# Patient Record
Sex: FEMALE | Race: ASIAN | Employment: UNEMPLOYED | URBAN - METROPOLITAN AREA
[De-identification: names, ages, dates, MRNs, and addresses within clinical notes are randomized per-mention and may not be internally consistent; named-entity substitution may affect disease eponyms.]

---

## 2018-04-13 ENCOUNTER — HOSPITAL ENCOUNTER (OUTPATIENT)
Dept: RADIOLOGY | Facility: HOSPITAL | Age: 33
Discharge: HOME | End: 2018-04-13
Attending: PHYSICIAN ASSISTANT
Payer: COMMERCIAL

## 2018-04-13 ENCOUNTER — OFFICE VISIT (OUTPATIENT)
Dept: SPORTS MEDICINE | Facility: CLINIC | Age: 33
End: 2018-04-13
Payer: COMMERCIAL

## 2018-04-13 VITALS
DIASTOLIC BLOOD PRESSURE: 73 MMHG | SYSTOLIC BLOOD PRESSURE: 109 MMHG | WEIGHT: 150 LBS | HEART RATE: 79 BPM | HEIGHT: 67 IN | BODY MASS INDEX: 23.54 KG/M2

## 2018-04-13 DIAGNOSIS — M25.561 ACUTE PAIN OF RIGHT KNEE: Primary | ICD-10-CM

## 2018-04-13 DIAGNOSIS — M25.561 RIGHT KNEE PAIN, UNSPECIFIED CHRONICITY: ICD-10-CM

## 2018-04-13 PROCEDURE — 73564 X-RAY EXAM KNEE 4 OR MORE: CPT | Mod: TC,50,FY,PO

## 2018-04-13 PROCEDURE — 99999 PR PBB SHADOW E&M-NEW PATIENT-LVL III: CPT | Mod: PBBFAC,,, | Performed by: PHYSICIAN ASSISTANT

## 2018-04-13 PROCEDURE — 73564 X-RAY EXAM KNEE 4 OR MORE: CPT | Mod: 26,50,, | Performed by: RADIOLOGY

## 2018-04-13 PROCEDURE — 99203 OFFICE O/P NEW LOW 30 MIN: CPT | Mod: S$GLB,,, | Performed by: PHYSICIAN ASSISTANT

## 2018-04-16 NOTE — PROGRESS NOTES
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 Pain began 2 months ago after she was forced to work on her feet more at [employer] due to a shortage of employees. She describes her pain as aching and intermittent. Rates her pain at a 4/10 on a pain scale when at its worse.     She reports that the pain is worse with steps.  It also bothers her at night. Hurts when carrying her 22lb kid. Her knee hurts if it is bent to long.    Is affecting ADLs.     No mechanical symptoms, no instability    Review of Systems   Constitution: Negative. Negative for chills, fever and night sweats.   HENT: Negative for congestion and headaches.    Eyes: Negative for blurred vision, left vision loss and right vision loss.   Cardiovascular: Negative for chest pain and syncope.   Respiratory: Negative for cough and shortness of breath.    Endocrine: Negative for polydipsia, polyphagia and polyuria.   Hematologic/Lymphatic: Negative for bleeding problem. Does not bruise/bleed easily.   Skin: Negative for dry skin, itching and rash.   Musculoskeletal: Negative for falls and muscle weakness.   Gastrointestinal: Negative for abdominal pain and bowel incontinence.   Genitourinary: Negative for bladder incontinence and nocturia.   Neurological: Negative for disturbances in coordination, loss of balance and seizures.   Psychiatric/Behavioral: Negative for depression. The patient does not have insomnia.    Allergic/Immunologic: Negative for hives and persistent infections.   All other systems negative      PAST MEDICAL HISTORY: History reviewed. No pertinent past medical history.  PAST SURGICAL HISTORY: History reviewed. No pertinent surgical history.  FAMILY HISTORY: History reviewed. No pertinent family history.  SOCIAL HISTORY:   Social History     Social History    Marital status:      Spouse name: N/A    Number of children: N/A    Years of  "education: N/A     Occupational History    Not on file.     Social History Main Topics    Smoking status: Never Smoker    Smokeless tobacco: Not on file    Alcohol use No    Drug use: No    Sexual activity: Not on file     Other Topics Concern    Not on file     Social History Narrative    No narrative on file       MEDICATIONS:   Current Outpatient Prescriptions:     hydrocortisone-pramoxine (ANALPRAM-HC) 2.5-1 % Crea, Place rectally 3 (three) times daily., Disp: 1 Tube, Rfl: 1    linaclotide (LINZESS) 145 mcg Cap, Take 145 mcg by mouth once daily., Disp: 30 capsule, Rfl: 3  ALLERGIES: Review of patient's allergies indicates:  No Known Allergies    VITAL SIGNS: /73   Pulse 79   Ht 5' 7" (1.702 m)   Wt 68 kg (150 lb)   LMP 04/06/2018 (Exact Date)   BMI 23.49 kg/m²      PHYSICAL EXAMINATION    General:  The patient is alert and oriented x 3.  Mood is pleasant.  Observation of ears, eyes and nose reveal no gross abnormalities.  HEENT: NCAT, sclera nonicteric  Lungs: Respirations are equal and unlabored.    right  KNEE EXAMINATION     OBSERVATION / INSPECTION   Gait:   Nonantalgic   Alignment:  Neutral   Scars:   None   Muscle atrophy: Mild  Effusion:  None   Warmth:  None   Discoloration:   none     TENDERNESS / CREPITUS (T / C):          T / C      T / C   Patella   - / -   Lateral joint line   - / -      Peripatellar medial  +  Medial joint line    - / -   Peripatellar lateral +  Medial plica   - / -   Patellar tendon -   Popliteal fossa  - / -   Quad tendon   -   Gastrocnemius   -   Prepatellar Bursa - / -   Quadricep   -   Tibial tubercle  -  Thigh/hamstring  -   Pes anserine/HS -  Fibula    -   ITB   - / -  Tibia     -   Tib/fib joint  - / -  LCL    -     MFC   - / -   MCL: Proximal  -    LFC   - / -    Distal   -          ROM: (* = pain)  PASSIVE   ACTIVE    Left :   5 / 0 / 145   5 / 0 / 145     Right :    5 / 0 / 145   5 / 0 / 145    Patellofemoral examination:  See above noted areas of " tenderness.   Patella position    Subluxation / dislocation: Centered           Sup. / Inf;   Normal   Crepitus (PF):    Absent   Patellar Mobility:       Medial-lateral:   Normal    Superior-inferior:  Normal    Inferior tilt   Normal    Patellar tendon:  Normal   Lateral tilt:    Normal   J-sign:     None   Patellofemoral grind:   neg      MENISCAL SIGNS:     Pain on terminal extension:  -  Pain on terminal flexion:  -  Pricillas maneuver:  -  Squat     + anterior    LIGAMENT EXAMINATION:  ACL / Lachman:  normal (-1 to 2mm)    PCL-Post.  drawer: normal 0 to 2mm  MCL- Valgus:  normal 0 to 2mm  LCL- Varus:  normal 0 to 2mm  Pivot shift: normal (Equal)   Dial Test: difference c/w other side   At 30° flexion: normal (< 5°)    At 90° flexion: normal (< 5°)   Reverse Pivot Shift:   normal (Equal)     STRENGTH: (* = with pain) PAINFUL SIDE   Quadricep   4+/5   Hamstrin/5    EXTREMITY NEURO-VASCULAR EXAMINATION:   Sensation:  Grossly intact to light touch all dermatomal regions.   Motor Function:  Fully intact motor function at hip, knee, foot and ankle    DTRs;  quadriceps and  achilles 2+.  No clonus and downgoing Babinski.    Vascular status:  DP and PT pulses 2+, brisk capillary refill, symmetric.     Other Findings:  + step down bilat  + bridge test     Xrays:  Xrays of the bilateral knees with flexion were ordered and reviewed by me today. No fracture, subluxation, or other significant bony or joint abnormality is identified. Increased lateral tilt of the right patella compared to left noted.      ASSESSMENT:    1. Right Knee pain, acute  Bilateral hip abd/core weakness    PLAN:    1. PT for right knee pranav-patellar pain likely due to medial knee weakness, hip, pelvis and core weakness. PT for bilateral hip and core strengthening with focused work on right medial knee muscles and quad on right side. With HEP.  2. Ice compresses prn pain  3. NSAIDs prn  4. Recommended knee sleeve but patient would prefer to  hold off at this time  5. RTC in 2-3 for recheck    I have made the decision to order and/or review imaging (such as Xray, MRI, or CT) today. I have independently reviewed and interpreted the imaging studies documented above.      All questions were answered, pt will contact us for questions or concerns in the interim.

## 2018-04-18 ENCOUNTER — CLINICAL SUPPORT (OUTPATIENT)
Dept: REHABILITATION | Facility: HOSPITAL | Age: 33
End: 2018-04-18
Payer: COMMERCIAL

## 2018-04-18 DIAGNOSIS — R26.89 IMPAIRED GAIT AND MOBILITY: ICD-10-CM

## 2018-04-18 DIAGNOSIS — M25.561 CHRONIC PAIN OF RIGHT KNEE: Primary | ICD-10-CM

## 2018-04-18 DIAGNOSIS — R29.3 POSTURE ABNORMALITY: ICD-10-CM

## 2018-04-18 DIAGNOSIS — G89.29 CHRONIC PAIN OF RIGHT KNEE: Primary | ICD-10-CM

## 2018-04-18 DIAGNOSIS — R53.1 DECREASED STRENGTH: ICD-10-CM

## 2018-04-18 PROCEDURE — 97161 PT EVAL LOW COMPLEX 20 MIN: CPT | Mod: PN

## 2018-04-18 PROCEDURE — 97140 MANUAL THERAPY 1/> REGIONS: CPT | Mod: PN

## 2018-04-18 PROCEDURE — 97110 THERAPEUTIC EXERCISES: CPT | Mod: PN

## 2018-04-18 NOTE — PLAN OF CARE
TIME RECORD    Date: 4/18/2018    Start Time:  1520  Stop Time:  1610    PROCEDURES:    TIMED  Procedure Min.   TE 8   MT 12                 UNTIMED  Procedure Min.   IE 30         Total Timed Minutes:  20  Total Timed Units:  2  Total Untimed Units:  1  Charges Billed/# of units:  MT-1, TE-1, LCE-1    [facility]    PHYSICAL THERAPY EVALUATION  Onset Date: ~3 months ago  Primary Diagnosis:   1. Chronic pain of right knee     2. Posture abnormality     3. Decreased strength     4. Impaired gait and mobility       Treatment Diagnosis: R knee pain, impaired pelvic alignment, poor posture, decreased LE strength and flexibility, impaired mobility and gait  No past medical history on file.  Precautions: Standard  Prior Therapy: Never  Medications: Gertrude Callaway has a current medication list which includes the following prescription(s): hydrocortisone-pramoxine and linaclotide.  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 Pain has worsened since and is located primarily to the anterior aspect of the knee, alternating between medial and lateral sides. Stair ascent causes posterior knee pain. Pt had X-rays performed and reports tilting of R knee cap.    Pain:  Location: Anterior aspect of R knee.  Description: Intermittent, pinching, ache  Activities Which Increase Pain: Standing > 30 minutes, stair ascent, knee extension, crossing legs (crossing R leg over L increases  R lateral knee pain, opposite increases R medial pain); driving >15 minutes while putting pressure into pedals.  Activities Which Decrease Pain: Knee flexion  Pain Scale: 0/10 at best 5/10 now  6/10 at worst    Objective     Posture: Standing: B knee valgus, rearfoot valgus B L>R, R PSIS elevated compared to L  Palpation: Normal patellar mobility, discomfort in all planes on R  TTP to R piriformis  R iliac crest, AIIS, and medial malleolus lower compared to R in supine  Range of Motion/Strength:      LE MMTs  Hip flexion: 4/5 B  Hip extension: 4-5 B  Hip abudction: 4-5 R, 4/5 L  Hip adduction: 4-5 R, 4/5 L  Knee flexion: 5/5 B  Knee extension: 4/5 R, 4+/5 L  Ankle DF: 4/5 B    Flexibility: Darren test: (+) for impaired ITB flexibility B with reports of discomfort to lateral and center aspect of R patella   Prone knee bend (-) B   9090 HS length: -25 deg knee ext B  Gait: Without AD  Analysis: B trendelenburg gait; B knee valgus and toe out throughout  Bed Mobility:Independent  Transfers: Independent  Special Tests: Hamilton's compression: (+) on R for increased pain to lateral aspect of R knee towards full knee extension  Gabriel's sign (+) on R for minimal pain to lateral aspect of R2 knee with application of pressure during quad set  McMurrary's test (-) on R for pain with medial and lateral meniscus tests, NT L  Valgus test  (-) on R for pain at full knee extension and 30 deg knee flexion, NT L  Varus test  (-) on R for pain at full knee extension and 30 deg knee flexion, NT L  Long sitting test: (+) for R anterior rotated innominate     Functional Limitation Reports: G codes  Tool: FOTO Knee SURVEY  Category: Mobility ()  Limitation: 36%  Current: CJ = at least 20% but < 40% impaired, limited or restricted  Goal: CJ = at least 20% but < 40% impaired, limited or restricted    TREATMENT     Time In: 1520  Time Out: 1610    PT Evaluation Completed? Yes  Discussed Plan of Care with patient: Yes    Gertrude received 8  "minutes of therapeutic exercise & instruction including: mechanisms underlying current R lateral knee pain and exercises per log below    Date 4/18/18   Visit  Exp 12/31/18 1/50   POC 6/18/18     FOTO 1/5         Bike           MHP           MT 12'         Stretches     Supine HS     Supine ITB x30" R   Piriformis           Supine:     TAs    Dead Bug     Bridge     Self met for anterior rotated innominate correction HEP   Hip add/abd self met for pelvic alignment HEP   SLR w/ hip ER    SL hip abd     SL hip add    SL clams           Prone:     Alt UE/LE           Seated:     TAs     March     LAQs           Standing:     TAs     Lats     Rows     Steamboats    Leg Press             Initials CC     Gertrude received 12 minutes of manual therapy including: MET to correct and stabilize R anterior rotated innominate. Alleviation of R knee pain following with reports of R hip soreness.    Written Home Exercises Provided: See above  Gertrude demo good understanding of the education provided. Patient demo fair return demo of skill of exercises.    Assessment     Initial Assessment (Pertinent finding, problem list and factors affecting outcome): Pt is a 31 yo female presenting to PT with R knee pain possibly related to impaired pelvic alignment and possible ITB dysfunction. Impairments include pain greatest laterally during objective, (+) special tests, R anterior rotated innominate, decreased LE strength, impaired LE flexibility, poor posture, impaired gait, and functional deficits with standing and stair ascent. Pt would benefit from skilled PT to address limitations and increase functional mobility.    History  Co-morbidities and personal factors that may impact the plan of care Examination  Body Structures and Functions, activity limitations and participation restrictions that may impact the plan of care    Clinical Presentation   Co-morbidities:   HA        Personal Factors:   no deficits Body Regions:   back  lower " "extremities    Body Systems:    gross symmetry  ROM  strength  gross coordinated movement  balance  gait  transfers  motor control            Participation Restrictions:   None      Activity limitations:   Learning and applying knowledge  no deficits    General Tasks and Commands  no deficits    Communication  no deficits    Mobility  Standing, walking, stair ascent, knee extension    Self care  no deficits    Domestic Life  no deficits    Interactions/Relationships  no deficits    Life Areas  no deficits    Community and Social Life  community life  recreation and leisure         evolving clinical presentation with changing clinical characteristics                      moderate   low  high Decision Making/ Complexity Score:  moderate     Rehab Potiential: good  Spiritual/Cultural Needs: none reported  Barriers to Rehab: none  Short Term Goals (4 Weeks): 5/18/18  1. Pt will be compliant with HEP to supplement PT in decreasing pain with functional mobility.  2. Pt will perform and hold TA contraction for 10x10" in dynamic sitting activities to demonstrate improved core strength  3. Pt will improve impaired LE MMTs to >/=4/5 to improve strength for functional tasks.  4. Pt will improve B 9090 HS length >/=15 deg B to improve flexibility for normal movement patterns.   Long Term Goals (8 Weeks): 6/18/18  1. Pt will improve FOTO score to </= 25% limited to decrease perceived limitation with maintaining/changing body position  2. Pt will improve B 9090 HS length to full extension B to improve flexibility for normal movement patterns.   3. Pt will perform and hold TA contraction for 10x10" in dynamic standing activities to demonstrate improved core strength   4. Pt will improve impaired LE MMTs to >/=4+/5 to improve strength for functional tasks.  5. Pt will report no pain with standing > 1 hr to promote functional QOL.  6. Pt will report no pain with stair ascent promote functional mobility.  7. Pt will demonstrate " proper pelvic alignment for >/= 1 week to decrease stresses placed on surrounding structures during functional movement.    Plan     Certification Period: 4/18/18 to 6/18/18  Recommended Treatment Plan: 2 times per week for 8 weeks: Gait Training, Manual Therapy, Moist Heat/ Ice, Neuromuscular Re-ed, Patient Education, Therapeutic Activites and Therapeutic Exercise  Other Recommendations: modalities prn, ASTYM prn, kinesiotape prn, Functional Dry Needling prn       Sandra Mcduffie, PT  4/18/2018      I CERTIFY THE NEED FOR THESE SERVICES FURNISHED UNDER THIS PLAN OF TREATMENT AND WHILE UNDER MY CARE    Physician's comments: ________________________________________________________________________________________________________________________________________________      Physician's Name: ___________________________________

## 2018-04-20 ENCOUNTER — CLINICAL SUPPORT (OUTPATIENT)
Dept: REHABILITATION | Facility: HOSPITAL | Age: 33
End: 2018-04-20
Payer: COMMERCIAL

## 2018-04-20 DIAGNOSIS — R53.1 DECREASED STRENGTH: ICD-10-CM

## 2018-04-20 DIAGNOSIS — R26.89 IMPAIRED GAIT AND MOBILITY: ICD-10-CM

## 2018-04-20 DIAGNOSIS — R29.3 POSTURE ABNORMALITY: ICD-10-CM

## 2018-04-20 DIAGNOSIS — G89.29 CHRONIC PAIN OF RIGHT KNEE: ICD-10-CM

## 2018-04-20 DIAGNOSIS — M25.561 CHRONIC PAIN OF RIGHT KNEE: ICD-10-CM

## 2018-04-20 PROCEDURE — 97110 THERAPEUTIC EXERCISES: CPT | Mod: PN

## 2018-04-20 PROCEDURE — 97140 MANUAL THERAPY 1/> REGIONS: CPT | Mod: PN

## 2018-04-20 NOTE — PROGRESS NOTES
"DAILY TREATMENT NOTE    DATE: 4/20/2018    Start Time:  2:05  Stop Time:  3:00      PROCEDURES:    TIMED  Procedure Min.   TE 25   MT 10                 UNTIMED  Procedure Min.   Supervised/bike 20         Total Timed Minutes:  55  Total Timed Units:  4  Total Untimed Units:  0  Charges Billed/# of units: 2 TE + 1 MT      Progress/Current Status    Subjective:     Patient ID: Gertrude Callaway is a 32 y.o. female.  Diagnosis:   1. Chronic pain of right knee     2. Posture abnormality     3. Decreased strength     4. Impaired gait and mobility       Pain: 4 /10  R knee pain that is mainly on the medial and lateral sides    Objective:     Pt performed supervised TE x 20 mins per chart below. Pt received MT x 10 mins of hip/leg assessment and MET. Leg length assessed for discrepancy demo R>L leg length with level ASIS and no change with long sitting. PT performed MET with shotgun x 2 with capitation heard with shotgun. Improved leg length B and no change in ASIS level, cont to assess and perform as needed. Pt then performed TE x 25 mins per chart below with mod cueing for form/technique.     Date 4/20/18 4/18/18   Visit  Exp 12/31/18 2/50 1/50   POC 6/18/18      FOTO 2/5 1/5          Bike Next             MHP             MT 10' 12'          Stretches      Supine HS 3x30'' B     Supine ITB 3x30'' R x30" R   Piriformis 3x30'' R            Supine:      TAs Next ?     Dead Bug Next ?     Bridge 2x10 w/ hip add     Self met for anterior rotated innominate correction -- HEP   Hip add/abd self met for pelvic alignment -- HEP   SLR w/ hip ER 2x10     SL hip abd Next      SL hip add W/ bridge     SL clams 2x10 5'' holds  RTB            Prone:      Alt UE/LE Next?     Hip ext Next?     Hydrants     Seated:      TAs March      LAQs             Standing:      TAs      Lats 2x10 GTC     Rows      Steamboats      Leg Press 2x10 DL  5 plates, bench = 5              Initials DILIP CC       Assessment:     Pt performed well today and " "required mod cueing at times for speed and orientation of body during TE mainly SLR and leg press. Pt reported muscle fatigue and muscle burning during and after session, but minimal to no aching R hip and knee pain.     Patient Education/Response:     Cont HEP    Plans and Goals:     Cont per POC, progress per pt tolerance.    Short Term Goals (4 Weeks): 5/18/18  1. Pt will be compliant with HEP to supplement PT in decreasing pain with functional mobility.  2. Pt will perform and hold TA contraction for 10x10" in dynamic sitting activities to demonstrate improved core strength  3. Pt will improve impaired LE MMTs to >/=4/5 to improve strength for functional tasks.  4. Pt will improve B 9090 HS length >/=15 deg B to improve flexibility for normal movement patterns.   Long Term Goals (8 Weeks): 6/18/18  1. Pt will improve FOTO score to </= 25% limited to decrease perceived limitation with maintaining/changing body position  2. Pt will improve B 9090 HS length to full extension B to improve flexibility for normal movement patterns.   3. Pt will perform and hold TA contraction for 10x10" in dynamic standing activities to demonstrate improved core strength   4. Pt will improve impaired LE MMTs to >/=4+/5 to improve strength for functional tasks.  5. Pt will report no pain with standing > 1 hr to promote functional QOL.  6. Pt will report no pain with stair ascent promote functional mobility.  7. Pt will demonstrate proper pelvic alignment for >/= 1 week to decrease stresses placed on surrounding structures during functional movement.     "

## 2018-04-23 ENCOUNTER — CLINICAL SUPPORT (OUTPATIENT)
Dept: REHABILITATION | Facility: HOSPITAL | Age: 33
End: 2018-04-23
Payer: COMMERCIAL

## 2018-04-23 DIAGNOSIS — G89.29 CHRONIC PAIN OF RIGHT KNEE: ICD-10-CM

## 2018-04-23 DIAGNOSIS — M25.561 CHRONIC PAIN OF RIGHT KNEE: ICD-10-CM

## 2018-04-23 DIAGNOSIS — R26.89 IMPAIRED GAIT AND MOBILITY: ICD-10-CM

## 2018-04-23 DIAGNOSIS — R29.3 POSTURE ABNORMALITY: ICD-10-CM

## 2018-04-23 DIAGNOSIS — R53.1 DECREASED STRENGTH: ICD-10-CM

## 2018-04-23 PROCEDURE — 97110 THERAPEUTIC EXERCISES: CPT | Mod: PN

## 2018-04-23 PROCEDURE — 97140 MANUAL THERAPY 1/> REGIONS: CPT | Mod: PN

## 2018-04-23 NOTE — PROGRESS NOTES
"DAILY TREATMENT NOTE    DATE: 4/23/2018    Start Time: 1506  Stop Time: 1608      PROCEDURES:    TIMED  Procedure Min.   TE 50   MT 12                 UNTIMED  Procedure Min.   Supervised/bike 20         Total Timed Minutes: 62  Total Timed Units:  4  Total Untimed Units:  0  Charges Billed/# of units: TE-3, MT-1      Progress/Current Status    Subjective:     Patient ID: Gertrude Callaway is a 32 y.o. female.  Diagnosis:   1. Chronic pain of right knee     2. Posture abnormality     3. Decreased strength     4. Impaired gait and mobility       Pain: 0/10    Pt reports improvement in knee pain since eval, increased soreness in hips after session on Friday. Pt reports she has not been performing self MET everyday.    Objective:     MT x 12' including assessment of pelvic alignment and rotation MET and hip add/abd MET 2x to correct R anterior rotated innominate, no change on first attempt and min-mod improvement on second attempt. TE x 50' per log.     Date 4/23/18 4/20/18 4/18/18   Visit  Exp 12/31/18 3/50 2/50 1/50   POC 6/18/18       FOTO 3/5 2/5 1/5           Bike OOT Next              MHP               MT 12' 10' 12'           Stretches       Supine HS 3x30" L 3x30'' B     Supine ITB 3x30'' R 3x30'' R x30" R   Piriformis 3x30'' R 3x30'' R     Darren  3x30" R             Supine:       TAs 5"x15 Next ?     Abdominal isometric  5"x10 fwd/side     Dead Bug 2x5 B, knee extension/hip neutral over mat Next ?     Bridge 2x15 DL w/ hip add 2x10 w/ hip add     Self met for anterior rotated innominate correction  -- HEP   Hip add/abd self met for pelvic alignment  -- HEP   SLR w/ hip ER 2x10 B 2x10     SL hip abd 2x10 B Next      SL hip add x10 B W/ bridge     SL clams 2x10 3'' holds  RTB 2x10 5'' holds  RTB             Prone:       Alt UE/LE Next Next?     Hip ext Next Next?     Hydrants            Seated:       TAs March       LAQs               Standing:       TAs       Lats OOT 2x10 GTC     Rows       Steamboats     " "  Leg Press OOT 2x10 DL  5 plates, bench = 5               Initials CC DILIP CC     Assessment:     Mod cues for increasing speed of exercise performance, session limited 2/2 time taken to perform exercises. Pt appeared challenged performing SL hip exercises as well as dead bug. Pt reported muscle fatigue and muscle burning during and after session, but no increase in pain.    Patient Education/Response:     Cont HEP, perform MET 1-2x/day. PT verbalized understanding.     Plans and Goals:     Cont per POC, progress per pt tolerance.    Short Term Goals (4 Weeks): 5/18/18  1. Pt will be compliant with HEP to supplement PT in decreasing pain with functional mobility.  2. Pt will perform and hold TA contraction for 10x10" in dynamic sitting activities to demonstrate improved core strength  3. Pt will improve impaired LE MMTs to >/=4/5 to improve strength for functional tasks.  4. Pt will improve B 9090 HS length >/=15 deg B to improve flexibility for normal movement patterns.   Long Term Goals (8 Weeks): 6/18/18  1. Pt will improve FOTO score to </= 25% limited to decrease perceived limitation with maintaining/changing body position  2. Pt will improve B 9090 HS length to full extension B to improve flexibility for normal movement patterns.   3. Pt will perform and hold TA contraction for 10x10" in dynamic standing activities to demonstrate improved core strength   4. Pt will improve impaired LE MMTs to >/=4+/5 to improve strength for functional tasks.  5. Pt will report no pain with standing > 1 hr to promote functional QOL.  6. Pt will report no pain with stair ascent promote functional mobility.  7. Pt will demonstrate proper pelvic alignment for >/= 1 week to decrease stresses placed on surrounding structures during functional movement.     "

## 2018-04-26 ENCOUNTER — CLINICAL SUPPORT (OUTPATIENT)
Dept: REHABILITATION | Facility: HOSPITAL | Age: 33
End: 2018-04-26
Payer: COMMERCIAL

## 2018-04-26 DIAGNOSIS — G89.29 CHRONIC PAIN OF RIGHT KNEE: ICD-10-CM

## 2018-04-26 DIAGNOSIS — R26.89 IMPAIRED GAIT AND MOBILITY: ICD-10-CM

## 2018-04-26 DIAGNOSIS — M25.561 CHRONIC PAIN OF RIGHT KNEE: ICD-10-CM

## 2018-04-26 DIAGNOSIS — R53.1 DECREASED STRENGTH: ICD-10-CM

## 2018-04-26 DIAGNOSIS — R29.3 POSTURE ABNORMALITY: ICD-10-CM

## 2018-04-26 PROCEDURE — 97110 THERAPEUTIC EXERCISES: CPT | Mod: PN

## 2018-04-26 NOTE — PROGRESS NOTES
"TIME RECORD    Date:  04/26/2018    Start Time:  08:00 am   Stop Time:  09:00 am     PROCEDURES:    TIMED  Procedure Time Min.   TE Start:08:00 am   Stop:08;40 am  40   TE (supervised  Start:08:40 am   Stop:09:00 am  20 (no charge)      Total Timed Minutes:  40  Total Timed Units:  3  Total Untimed Units:  0  Charges Billed/# of units:  3 TE    Progress/Current Status    Subjective:     Patient ID: Gertrude Callaway is a 32 y.o. female.  Diagnosis:   1. Chronic pain of right knee     2. Posture abnormality     3. Decreased strength     4. Impaired gait and mobility       Pain: 1 /10  Patient reports I can't say that I don't have no pain but it is probably down to about a 1/10. 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     Objective:     Patient completed therex per log as below 1:1 with PT x 40 minutes and an additional 20 minutes with PT tech under direct PT supervision including patient on stationary bike for trial without any complaints of increase in pain. PT also placed trial kinesiotape at right patellar region with inhibition for patellar tendon with patient educated to remove if patient experiencing any increase in pain, redness or soreness with patient demonstrating understanding. No significant tenderness to palpation throughout right patellar region.      Date 4/26/18 4/23/18 4/20/18 4/18/18   Visit  Exp 12/31/18 4/50 3/50 2/50 1/50   POC 6/18/18        FOTO 4/5 3/5 2/5 1/5            Bike 5' OOT Next               MHP --                MT -- 12' 10' 12'            Stretches        Supine HS 2x30" B 3x30" L 3x30'' B     Supine ITB 2x30" B 3x30'' R 3x30'' R x30" R   Piriformis 2x30" B 3x30'' R 3x30'' R     Darren  2x30" R 3x30" R     Gastroc Stretch - Fitter 2x30 B       Supine:        TAs 5"x15 5"x15 Next ?     Abdominal isometric  -- 5"x10 fwd/side     Dead " "Bug -- 2x5 B, knee extension/hip neutral over mat Next ?     Bridge 2x15 DL   w/hip add 2x15 DL w/ hip add 2x10 w/ hip add     Self met for anterior rotated innominate correction --  -- HEP   Hip add/abd self met for pelvic alignment --  -- HEP   SLR w/ hip ER 2x10 B 2x10 B 2x10     SL hip abd 2x10 B 2x10 B Next      SL hip add 2x10 B x10 B W/ bridge     SL clams -- 2x10 3'' holds  RTB 2x10 5'' holds  RTB              Prone:        Alt UE/LE -- Next Next?     Hip ext 2x10 B *note Next Next?     Hydrants              Seated:        TAs        March        LAQs                 Standing:        TAs --       Lats -- OOT 2x10 GTC     Rows --       Steamboats --       Cybex knee ext 10#  2x10       Leg Press S=5, 5.0  2x10 DL OOT 2x10 DL  5 plates, bench = 5                Initials MNB CC DILIP CC       Assessment:     Patient with increased reports of fatigue through right LE throughout session including at right quad and hip musculature however no significant complaints of increase in pain. Patient with trial kinesiotape to unload right patellar tendon region and no significant tenderness to palpation throughout right patellar region.     Patient Education/Response:     Patient educated to continue to complete HEP on days not attending therapy with patient demonstrating understanding. Patient educated on kinesiotape     Plans and Goals:     Continue as paul, progress with right LE stretching and strengthening. Assess aligment of pelvis as able and adjust as needed.     Short Term Goals (4 Weeks): 5/18/18  1. Pt will be compliant with HEP to supplement PT in decreasing pain with functional mobility.  2. Pt will perform and hold TA contraction for 10x10" in dynamic sitting activities to demonstrate improved core strength  3. Pt will improve impaired LE MMTs to >/=4/5 to improve strength for functional tasks.  4. Pt will improve B 9090 HS length >/=15 deg B to improve flexibility for normal movement patterns.   Long Term " "Goals (8 Weeks): 6/18/18  1. Pt will improve FOTO score to </= 25% limited to decrease perceived limitation with maintaining/changing body position  2. Pt will improve B 9090 HS length to full extension B to improve flexibility for normal movement patterns.   3. Pt will perform and hold TA contraction for 10x10" in dynamic standing activities to demonstrate improved core strength   4. Pt will improve impaired LE MMTs to >/=4+/5 to improve strength for functional tasks.  5. Pt will report no pain with standing > 1 hr to promote functional QOL.  6. Pt will report no pain with stair ascent promote functional mobility.  7. Pt will demonstrate proper pelvic alignment for >/= 1 week to decrease stresses placed on surrounding structures during functional movement.         "

## 2018-05-02 ENCOUNTER — CLINICAL SUPPORT (OUTPATIENT)
Dept: REHABILITATION | Facility: HOSPITAL | Age: 33
End: 2018-05-02
Payer: COMMERCIAL

## 2018-05-02 DIAGNOSIS — G89.29 CHRONIC PAIN OF RIGHT KNEE: ICD-10-CM

## 2018-05-02 DIAGNOSIS — R53.1 DECREASED STRENGTH: ICD-10-CM

## 2018-05-02 DIAGNOSIS — R29.3 POSTURE ABNORMALITY: ICD-10-CM

## 2018-05-02 DIAGNOSIS — R26.89 IMPAIRED GAIT AND MOBILITY: ICD-10-CM

## 2018-05-02 DIAGNOSIS — M25.561 CHRONIC PAIN OF RIGHT KNEE: ICD-10-CM

## 2018-05-02 PROCEDURE — 97110 THERAPEUTIC EXERCISES: CPT | Mod: PN

## 2018-05-04 ENCOUNTER — TELEPHONE (OUTPATIENT)
Dept: REHABILITATION | Facility: HOSPITAL | Age: 33
End: 2018-05-04

## 2018-05-04 ENCOUNTER — CLINICAL SUPPORT (OUTPATIENT)
Dept: REHABILITATION | Facility: HOSPITAL | Age: 33
End: 2018-05-04
Payer: COMMERCIAL

## 2018-05-04 DIAGNOSIS — R29.3 POSTURE ABNORMALITY: ICD-10-CM

## 2018-05-04 DIAGNOSIS — G89.29 CHRONIC PAIN OF RIGHT KNEE: ICD-10-CM

## 2018-05-04 DIAGNOSIS — R26.89 IMPAIRED GAIT AND MOBILITY: ICD-10-CM

## 2018-05-04 DIAGNOSIS — R53.1 DECREASED STRENGTH: ICD-10-CM

## 2018-05-04 DIAGNOSIS — M25.561 CHRONIC PAIN OF RIGHT KNEE: ICD-10-CM

## 2018-05-04 PROCEDURE — 97110 THERAPEUTIC EXERCISES: CPT | Mod: PN

## 2018-05-04 NOTE — PROGRESS NOTES
"DAILY TREATMENT NOTE    DATE: 5/4/2018    Start Time:  415  Stop Time:  515    PROCEDURES:    TIMED  Procedure Min.   Therex supervised 25   Therex 25         Total Timed Minutes:  25  Total Timed Units:  2  Total Untimed Units:  0  Charges Billed/# of units:  2 TE      Progress/Current Status    Subjective:     Patient ID: Gertrude Callaway is a 32 y.o. female.  Diagnosis:   1. Chronic pain of right knee     2. Posture abnormality     3. Decreased strength     4. Impaired gait and mobility       Patient reports not having any knee pain today despite being on her feet all day at work.    Objective:     Pt performed supervised TE x 25 mins per log below followed by TE 1:1 with PTA x 25 mins. Required several short rest breaks, trial L neural glides 2x10 to resolve reports of "numbness tickling" after stretching.    Date 5/4/18 5/2/18 4/26/18 4/23/18 4/20/18 4/18/18   Visit  Exp 12/31/18 6/50 5/50 4/50 3/50 2/50 1/50   POC 6/18/18          FOTO 6/10  DONE 5/5  DONE 4/5 3/5 2/5 1/5      NT        Bike 5  5' OOT Next                 MHP  -- --                  MT  -- -- 12' 10' 12'              Stretches          Supine HS 2x30'' B 2x30'' B 2x30" B 3x30" L 3x30'' B     Supine ITB 2x30'' B 2x30'' B 2x30" B 3x30'' R 3x30'' R x30" R   Piriformis 2x30'' B 2x30'' B 2x30" B 3x30'' R 3x30'' R     Darren  2x30'' B 2x30'' B 2x30" R 3x30" R     Gastroc Stretch - Fitter 2x30'' B 2x30'' B 2x30 B       Supine:          TAs 5"x15 5''x15 5"x15 5"x15 Next ?     Abdominal isometric   -- -- 5"x10 fwd/side     Dead Bug  -- -- 2x5 B, knee extension/hip neutral over mat Next ?     Bridge 2x15 DL  Min LBP 2x15 DL  Min LBP 2x15 DL   w/hip add 2x15 DL w/ hip add 2x10 w/ hip add     Self met for anterior rotated innominate correction  -- --  -- HEP   Hip add/abd self met for pelvic alignment  -- --  -- HEP   SLR w/ hip ER 2x10 B 2x10 B 2x10 B 2x10 B 2x10     SL hip abd 2x15 B 2x15 B 2x10 B 2x10 B Next      SL hip add -- -- 2x10 B x10 B W/ bridge   " "  SL clams 2x10 3'' holds  RTB 2x10 3'' holds  RTB -- 2x10 3'' holds  RTB 2x10 5'' holds  RTB                Prone:          Alt UE/LE  -- -- Next Next?     Hip ext oot oot 2x10 B *note Next Next?     Hydrants                  Seated:          TAs March          LAQs                     Standing:          TAs   --       Lats   -- OOT 2x10 GTC     Rows   --       Steamboats   --       Cybex knee ext oot oot 10#  2x10       Leg Press oot oot   S=5, 5.0  2x10 DL OOT 2x10 DL  5 plates, bench = 5                  Initials DH 1/6 DILIP MNB CC DILIP CC       Assessment:     Patient with intermittent reports of "numbness/tickling" B feet with stretching, R resolved immediately, L resolved after trial neural glides.  Patient also with complaint of LBP with TAs and bridges.  Reports continued minimal LBP after session.  Numbness/tickling resolved, no reports of knee pain.    Patient Education/Response:     Patient educated to continue HEP. Edu not to pus into pain with TE or stretching.  Verbalized understanding.     Plans and Goals:     Continue as paul, progress with right LE stretching and strengthening. Assess aligment of pelvis as able and adjust as needed.     Short Term Goals (4 Weeks): 5/18/18  1. Pt will be compliant with HEP to supplement PT in decreasing pain with functional mobility.  2. Pt will perform and hold TA contraction for 10x10" in dynamic sitting activities to demonstrate improved core strength  3. Pt will improve impaired LE MMTs to >/=4/5 to improve strength for functional tasks.  4. Pt will improve B 9090 HS length >/=15 deg B to improve flexibility for normal movement patterns.   Long Term Goals (8 Weeks): 6/18/18  1. Pt will improve FOTO score to </= 25% limited to decrease perceived limitation with maintaining/changing body position  2. Pt will improve B 9090 HS length to full extension B to improve flexibility for normal movement patterns.   3. Pt will perform and hold TA contraction for " "10x10" in dynamic standing activities to demonstrate improved core strength   4. Pt will improve impaired LE MMTs to >/=4+/5 to improve strength for functional tasks.  5. Pt will report no pain with standing > 1 hr to promote functional QOL.  6. Pt will report no pain with stair ascent promote functional mobility.  7. Pt will demonstrate proper pelvic alignment for >/= 1 week to decrease stresses placed on surrounding structures during functional movement.     "

## 2018-05-08 ENCOUNTER — CLINICAL SUPPORT (OUTPATIENT)
Dept: REHABILITATION | Facility: HOSPITAL | Age: 33
End: 2018-05-08
Attending: PHYSICIAN ASSISTANT
Payer: COMMERCIAL

## 2018-05-08 DIAGNOSIS — R26.89 IMPAIRED GAIT AND MOBILITY: ICD-10-CM

## 2018-05-08 DIAGNOSIS — M25.561 CHRONIC PAIN OF RIGHT KNEE: ICD-10-CM

## 2018-05-08 DIAGNOSIS — R53.1 DECREASED STRENGTH: ICD-10-CM

## 2018-05-08 DIAGNOSIS — R29.3 POSTURE ABNORMALITY: ICD-10-CM

## 2018-05-08 DIAGNOSIS — G89.29 CHRONIC PAIN OF RIGHT KNEE: ICD-10-CM

## 2018-05-08 PROCEDURE — 97110 THERAPEUTIC EXERCISES: CPT | Mod: PN

## 2018-05-08 NOTE — PROGRESS NOTES
"DAILY TREATMENT NOTE    DATE: 5/8/2018    Start Time:  9:02  Stop Time:  10:00    PROCEDURES:    TIMED  Procedure Min.   Therex supervised 35   Therex 23         Total Timed Minutes:  25  Total Timed Units:  2  Total Untimed Units:  0  Charges Billed/# of units:  2 TE      Progress/Current Status    Subjective:     Patient ID: Gertrude Callaway is a 32 y.o. female.  Diagnosis:   1. Chronic pain of right knee     2. Posture abnormality     3. Decreased strength     4. Impaired gait and mobility       Patient reports not having any knee pain today despite being on her feet all day at work.    Objective:     Pt performed supervised TE x 23 mins 1:1 with PT per log below followed by supervised TE x 30 mins.    Date 5/8/18 5/4/18 5/2/18 4/26/18 4/23/18 4/20/18 4/18/18   Visit  Exp 12/31/18 6/50 6/50 5/50 4/50 3/50 2/50 1/50   POC 6/18/18           FOTO 7/10 6/10  DONE 5/5  DONE 4/5 3/5 2/5 1/5       NT        Bike 5' 5  5' OOT Next                  MHP   -- --                   MT   -- -- 12' 10' 12'               Stretches           Supine HS 3x30'' B 2x30'' B 2x30'' B 2x30" B 3x30" L 3x30'' B     Supine ITB NT 2x30'' B 2x30'' B 2x30" B 3x30'' R 3x30'' R x30" R   Piriformis 2x30'' B 2x30'' B 2x30'' B 2x30" B 3x30'' R 3x30'' R     Darren  NT 2x30'' B 2x30'' B 2x30" R 3x30" R     Gastroc Stretch - Fitter W/ HSS 2x30'' B 2x30'' B 2x30 B       Supine:           TAs 5''x10 w/ kegel 5"x15 5''x15 5"x15 5"x15 Next ?     Abdominal isometric    -- -- 5"x10 fwd/side     Dead Bug   -- -- 2x5 B, knee extension/hip neutral over mat Next ?     Bridge  2x15 DL 2x15 DL  Min LBP 2x15 DL  Min LBP 2x15 DL   w/hip add 2x15 DL w/ hip add 2x10 w/ hip add     Self met for anterior rotated innominate correction   -- --  -- HEP   Hip add/abd self met for pelvic alignment   -- --  -- HEP   SLR w/ hip ER 2x15 B 2x10 B 2x10 B 2x10 B 2x10 B 2x10     SL hip abd 2x10 B 1# 2x15 B 2x15 B 2x10 B 2x10 B Next      SL hip add  -- -- 2x10 B x10 B W/ bridge     SL " "clams 10''x15 RTB 2x10 3'' holds  RTB 2x10 3'' holds  RTB -- 2x10 3'' holds  RTB 2x10 5'' holds  RTB                 Prone:           Alt UE/LE   -- -- Next Next?     Hip ext NT oot oot 2x10 B *note Next Next?     Hydrants                    Seated:           TAs March           LAQs                       Standing:           TAs    --       Lats    -- OOT 2x10 GTC     Rows    --       Steamboats    --       Cybex knee ext NT oot oot 10#  2x10       Leg Press S=5, 5.0  2x10 DL oot oot   S=5, 5.0  2x10 DL OOT 2x10 DL  5 plates, bench = 5                   Initials DILIP DH 1/6 DILIP MNB CC DILIP CC       Assessment:     Pt reports no pain during TE or after, and tolerated all TE well. Pt cont to be challenged by all TE significantly, and pt reported after being questions that she was not doing her HEP. Pt education given on importance of HEP, and that HEP needed to progress as needed.    Patient Education/Response:     Patient educated to continue HEP. Edu not to pus into pain with TE or stretching.  Verbalized understanding.     Plans and Goals:     Continue as paul, progress with right LE stretching and strengthening. Assess aligment of pelvis as able and adjust as needed.     Short Term Goals (4 Weeks): 5/18/18  1. Pt will be compliant with HEP to supplement PT in decreasing pain with functional mobility.  2. Pt will perform and hold TA contraction for 10x10" in dynamic sitting activities to demonstrate improved core strength  3. Pt will improve impaired LE MMTs to >/=4/5 to improve strength for functional tasks.  4. Pt will improve B 9090 HS length >/=15 deg B to improve flexibility for normal movement patterns.   Long Term Goals (8 Weeks): 6/18/18  1. Pt will improve FOTO score to </= 25% limited to decrease perceived limitation with maintaining/changing body position  2. Pt will improve B 9090 HS length to full extension B to improve flexibility for normal movement patterns.   3. Pt will perform and hold TA " "contraction for 10x10" in dynamic standing activities to demonstrate improved core strength   4. Pt will improve impaired LE MMTs to >/=4+/5 to improve strength for functional tasks.  5. Pt will report no pain with standing > 1 hr to promote functional QOL.  6. Pt will report no pain with stair ascent promote functional mobility.  7. Pt will demonstrate proper pelvic alignment for >/= 1 week to decrease stresses placed on surrounding structures during functional movement.     "

## 2018-05-10 ENCOUNTER — CLINICAL SUPPORT (OUTPATIENT)
Dept: REHABILITATION | Facility: HOSPITAL | Age: 33
End: 2018-05-10
Payer: COMMERCIAL

## 2018-05-10 DIAGNOSIS — R29.3 POSTURE ABNORMALITY: ICD-10-CM

## 2018-05-10 DIAGNOSIS — M25.561 CHRONIC PAIN OF RIGHT KNEE: ICD-10-CM

## 2018-05-10 DIAGNOSIS — R53.1 DECREASED STRENGTH: ICD-10-CM

## 2018-05-10 DIAGNOSIS — R26.89 IMPAIRED GAIT AND MOBILITY: ICD-10-CM

## 2018-05-10 DIAGNOSIS — G89.29 CHRONIC PAIN OF RIGHT KNEE: ICD-10-CM

## 2018-05-10 PROCEDURE — 97110 THERAPEUTIC EXERCISES: CPT | Mod: PN

## 2018-05-15 ENCOUNTER — CLINICAL SUPPORT (OUTPATIENT)
Dept: REHABILITATION | Facility: HOSPITAL | Age: 33
End: 2018-05-15
Payer: COMMERCIAL

## 2018-05-15 DIAGNOSIS — G89.29 CHRONIC PAIN OF RIGHT KNEE: ICD-10-CM

## 2018-05-15 DIAGNOSIS — M25.561 CHRONIC PAIN OF RIGHT KNEE: ICD-10-CM

## 2018-05-15 DIAGNOSIS — R29.3 POSTURE ABNORMALITY: ICD-10-CM

## 2018-05-15 DIAGNOSIS — R53.1 DECREASED STRENGTH: ICD-10-CM

## 2018-05-15 DIAGNOSIS — R26.89 IMPAIRED GAIT AND MOBILITY: ICD-10-CM

## 2018-05-15 PROCEDURE — 97110 THERAPEUTIC EXERCISES: CPT | Mod: PN

## 2018-05-15 NOTE — PROGRESS NOTES
"TIME RECORD    Date:  05/15/2018    Start Time:  9:00 AM  Stop Time:  9:50 AM    PROCEDURES:    TIMED  Procedure Min.   therex supervised NC   therex 25                 UNTIMED  Procedure Time Min.    Start:  Stop:     Start:  Stop:      Total Timed Minutes:  25  Total Timed Units:  2  Total Untimed Units:  0  Charges Billed/# of units:  2 (TE=2)      Progress/Current Status    Subjective:     Patient ID: Gertrude Callaway is a 32 y.o. female.  Diagnosis:   1. Chronic pain of right knee     2. Posture abnormality     3. Decreased strength     4. Impaired gait and mobility       Pain: Patient reports that her knee feels about the same. She states she is concerned that she is not showing nay significant improvements since starting physical therapy.    Objective:      Patient warmed up on stationary bike for 5 minutes followed by 20 minutes of therex supervised by PT tech. Patient then performed therex 1:1 with PT per exercise log for 25 minutes for strengthening.    Date 5/15/18 5/10/18 5/8/18 5/4/18 5/2/18 4/26/18 4/23/18 4/20/18 4/18/18   Visit  Exp 12/31/18 8/50 7/50 6/50 6/50 5/50 4/50 3/50 2/50 1/50   POC 6/18/18             FOTO 9/10 NEXT 8/10 7/10 6/10  DONE 5/5  DONE 4/5 3/5 2/5 1/5         NT        Bike 5' 5' 5' 5  5' OOT Next                    [facility] --    -- --                     MT --    -- -- 12' 10' 12'                 Stretches             Supine HS 30"x3 B 30"x3 B 3x30'' B 2x30'' B 2x30'' B 2x30" B 3x30" L 3x30'' B     Supine ITB 30"x3 B  30"x3 B NT 2x30'' B 2x30'' B 2x30" B 3x30'' R 3x30'' R x30" R   Piriformis 30"x3 B 30"x3 B 2x30'' B 2x30'' B 2x30'' B 2x30" B 3x30'' R 3x30'' R     Darren  30"x3 B 30"x3 B NT 2x30'' B 2x30'' B 2x30" R 3x30" R     Gastroc Stretch - Fitter W/ HSS  W/ HSS 2x30'' B 2x30'' B 2x30 B       Supine:             TAs 5"x15 w/ kegel 5"x15 w/kegel 5''x10 w/ kegel 5"x15 5''x15 5"x15 5"x15 Next ?     Abdominal isometric  --    -- -- 5"x10 fwd/side     Dead Bug --    -- -- 2x5 B, knee " "extension/hip neutral over mat Next ?     Bridge  2x15 DL 2x15 DL 2x15 DL 2x15 DL  Min LBP 2x15 DL  Min LBP 2x15 DL   w/hip add 2x15 DL w/ hip add 2x10 w/ hip add     Self met for anterior rotated innominate correction --    -- --  -- HEP   Hip add/abd self met for pelvic alignment --    -- --  -- HEP   SLR w/ hip ER 2x15 B 2x15 B 2x15 B 2x10 B 2x10 B 2x10 B 2x10 B 2x10     SL hip abd 1# 2x10 B 1# 2x10 B 2x10 B 1# 2x15 B 2x15 B 2x10 B 2x10 B Next      SL hip add    -- -- 2x10 B x10 B W/ bridge     SL clams 10" X20 RTB 10"x15 RTB 10''x15 RTB 2x10 3'' holds  RTB 2x10 3'' holds  RTB -- 2x10 3'' holds  RTB 2x10 5'' holds  RTB                   Prone:             Alt UE/LE --    -- -- Next Next?     Hip ext --  NT oot oot 2x10 B *note Next Next?     Hydrants --                       Seated:             TAs             March             LAQs                           Standing:             TAs      --       Lats      -- OOT 2x10 GTC     Rows      --       Steamboats      --       Cybex knee ext   NT oot oot 10#  2x10       Leg Press 5.5 S=5  2x10 DL 5.0 S=5  3x10 DL S=5, 5.0  2x10 DL oot oot   S=5, 5.0  2x10 DL OOT 2x10 DL  5 plates, bench = 5                     Initials JBF  1/6 DILIP  1/6 DILIP MNB CC DILIP CC         Assessment:     Patient tolerated treatment well. Patient demonstrated difficulty with performing SLR and hip abd secondary to muscle fatigue and weakness. Patient required verbal and tactile cues in order to perform therex properly.    Patient Education/Response:     Continue current HEP.    Plans and Goals:     Continue PT POC as previously established. Progress therex as tolerated.    Short Term Goals (4 Weeks): 5/18/18  1. Pt will be compliant with HEP to supplement PT in decreasing pain with functional mobility.  2. Pt will perform and hold TA contraction for 10x10" in dynamic sitting activities to demonstrate improved core strength  3. Pt will improve impaired LE MMTs to >/=4/5 to improve strength for " "functional tasks.  4. Pt will improve B 9090 HS length >/=15 deg B to improve flexibility for normal movement patterns.   Long Term Goals (8 Weeks): 6/18/18  1. Pt will improve FOTO score to </= 25% limited to decrease perceived limitation with maintaining/changing body position  2. Pt will improve B 9090 HS length to full extension B to improve flexibility for normal movement patterns.   3. Pt will perform and hold TA contraction for 10x10" in dynamic standing activities to demonstrate improved core strength   4. Pt will improve impaired LE MMTs to >/=4+/5 to improve strength for functional tasks.  5. Pt will report no pain with standing > 1 hr to promote functional QOL.  6. Pt will report no pain with stair ascent promote functional mobility.  7. Pt will demonstrate proper pelvic alignment for >/= 1 week to decrease stresses placed on surrounding structures during functional movement.       "

## 2018-05-17 ENCOUNTER — CLINICAL SUPPORT (OUTPATIENT)
Dept: REHABILITATION | Facility: HOSPITAL | Age: 33
End: 2018-05-17
Payer: COMMERCIAL

## 2018-05-17 DIAGNOSIS — M25.561 CHRONIC PAIN OF RIGHT KNEE: ICD-10-CM

## 2018-05-17 DIAGNOSIS — R53.1 DECREASED STRENGTH: ICD-10-CM

## 2018-05-17 DIAGNOSIS — G89.29 CHRONIC PAIN OF RIGHT KNEE: ICD-10-CM

## 2018-05-17 DIAGNOSIS — R26.89 IMPAIRED GAIT AND MOBILITY: ICD-10-CM

## 2018-05-17 DIAGNOSIS — R29.3 POSTURE ABNORMALITY: ICD-10-CM

## 2018-05-17 PROCEDURE — 97110 THERAPEUTIC EXERCISES: CPT | Mod: PN

## 2018-05-17 PROCEDURE — 97140 MANUAL THERAPY 1/> REGIONS: CPT | Mod: PN

## 2018-05-17 NOTE — PATIENT INSTRUCTIONS
ITB stretch  Copyright © 0810-3592 [employer]  Loop a belt around your foot. While lying on your back and leg up in front of you and knee straight, bring your leg across midline for a gentle stretch felt along your outer thigh.    Hold 30 seconds. Repeat 3 times each side per set. Do 2 sets per session. Do 2 sessions per day.      Hamstring Stretch    Copyright © 5802-3090 [employer]  Straight right leg. Raise leg until a stretch is felt in the back of the thigh. Keep knee straight. Hold 30 seconds. Repeat 3 times each side per set. Do 2 sets per session. Do 2 sessions per day.      Quad Stretch    Copyright © 8037-1816 [employer]  Lie face down, knees together. Grasp right ankle with same-side hand or wrap a belt around the ankle. Gently pull foot toward buttock without twisting the knee. Hold 30 seconds.  Hold 30 seconds. Repeat 3 times each side per set. Do 2 sets per session. Do 2 sessions per day.      Achilles / Gastroc, Standing    Stand, right foot behind, heel on floor and toes turned slightly inward, leg straight, forward leg bent. Move hips forward. Hold 30 seconds.  Hold 30 seconds. Repeat 3 times each side per set. Do 2 sets per session. Do 2 sessions per day.      Straight Leg Raise with Hip External Rotation  Copyright © 4554-0210 [employer]  While lying or sitting, raise up your leg with a straight knee and your toes pointed outward.  Repeat 15 times each leg per set. Do 2 sets per session. Do 1 sessions per day.      Straight Leg Raise     With right leg straight, other leg bent, raise straight leg until knees are even. Slowly lower. Roll on your side and repeat lifting top leg up, on other side, lifting bottom leg up, and on stomach kicking back (squeezing your rear end before you kick back).  Repeat 15 times each leg per set. Do 2 sets per session. Do 1 sessions per day.       Clam Shells    Lie on side with knees bent. Raise top leg, keeping knee bent and ankles together. Do not let your hips  "roll back as your raise your leg.  Repeat 15 times each leg per set. Do 2 sets per session. Do 1 sessions per day.      Bridge  Copyright © 2800-1698 [employer]  While lying on your back, tighten your lower abdominals, squeeze your buttocks and then raise your buttocks off the floor/bed as creating a "Bridge" with your body. Hold and then lower yourself and repeat.  Repeat 15 times each leg per set. Do 2 sets per session. Do 1 sessions per day.      Terminal Knee Extension    Start in a standing position with an elastic band attached above your knee and the other end tied with a knot and fixated behind a closed door or other anchor. The target knee should be partially bent with your toes  touching the ground. Next, move your knee back towards a straightened position so that your heel touches the floor and you pull against the band.   Repeat 15 times each leg per set. Do 2 sets per session. Do 1 sessions per day.       Hip 4-way    Start with resistance band anchored to doorway or other object and looped around your ankle. Take a few steps to add some resistance to the band. While standing with a pole or other support in the opposite hand, begin exercise. You will perform this in 4 directions, keeping the knee straight throughout, maintaining balance, and performing quick, short kicks.   Top left: Kick out to the side. Top right: Kick forward. Bottom left: Kick backward. Bottom right: Kick in towards the other leg and a little forward.  Repeat on the other leg.    Repeat 15 times each leg per set. Do 2 sets per session. Do 1 sessions per day.       "

## 2018-05-17 NOTE — PROGRESS NOTES
"TIME RECORD    Date:  05/17/2018    Start Time: 1006  Stop Time: 1104    PROCEDURES:    TIMED  Procedure Min.   TE supervised    TE  40   MT 18            Total Timed Minutes:  58  Total Timed Units:  4  Total Untimed Units:  0  Charges Billed/# of units: TE-3, MT-1    Progress/Current Status    Subjective:     Patient ID: Gertrude Callaway is a 32 y.o. female.  Diagnosis:   1. Chronic pain of right knee     2. Posture abnormality     3. Decreased strength     4. Impaired gait and mobility       Pain: 2/10    Pt reports overall little improvement since beginning PT. 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 Pt reports performing HEP 2-3x/week.    Objective:      MT x 18' per log including assessment of pelvic alignment f/b MET to correct R anterior rotated innominate f/b shotgun technique to stabilize f/b reassessment and second set of MET f/b gait around gym to assess subjective changes in R knee pain with walking. TE x 40' per log, including assessment of gait on level surface without mirror f/b use of mirror for pt to visualize deficits. FOTO next!    Palpation: R anterior rotated innominate **assess each visit**  Gait: B knee valgus R>L; valgus collapse on R with IC/LR    Date 5/17/18 5/15/18 5/10/18 5/8/18 5/4/18 5/2/18 4/26/18 4/23/18 4/20/18 4/18/18   Visit  Exp 12/31/18 9/50 8/50 7/50 6/50 6/50 5/50 4/50 3/50 2/50 1/50   POC 6/18/18              FOTO 10/10 NEXT 9/10 NEXT 8/10 7/10 6/10  DONE 5/5  DONE 4/5 3/5 2/5 1/5          NT        Bike NT 5' 5' 5' 5  5' OOT Next                     MHP  --    -- --                      MT 18' --    -- -- 12' 10' 12'                  Stretches              Supine HS OOT HEP 30"x3 B 30"x3 B 3x30'' B 2x30'' B 2x30'' B 2x30" B 3x30" L 3x30'' B     Supine ITB OOT next 30"x3 B  30"x3 B NT 2x30'' B 2x30'' B 2x30" B 3x30'' R 3x30'' R x30" R   Piriformis OOT d/c 30"x3 B 30"x3 B 2x30'' B " "2x30'' B 2x30'' B 2x30" B 3x30'' R 3x30'' R     Darren  OOT next w/ strap 30"x3 B 30"x3 B NT 2x30'' B 2x30'' B 2x30" R 3x30" R     Gastroc Stretch - Fitter OOT d/c W/ HSS  W/ HSS 2x30'' B 2x30'' B 2x30 B                    Supine:              TAs 10"x10 w/ kegel 5"x15 w/ kegel 5"x15 w/kegel 5''x10 w/ kegel 5"x15 5''x15 5"x15 5"x15 Next ?     Abdominal isometric   --    -- -- 5"x10 fwd/side     Dead Bug 2x10 B knee extension/hip neutral over mat --    -- -- 2x5 B, knee extension/hip neutral over mat Next ?     Bridge  2x10 DL w/ 2kg ball 2x15 DL 2x15 DL 2x15 DL 2x15 DL  Min LBP 2x15 DL  Min LBP 2x15 DL   w/hip add 2x15 DL w/ hip add 2x10 w/ hip add     Self met for anterior rotated innominate correction HEP only  --    -- --  -- HEP   Hip add/abd self met for pelvic alignment  --    -- --  -- HEP   SLR w/ hip ER 1# 2x10 R 2x15 B 2x15 B 2x15 B 2x10 B 2x10 B 2x10 B 2x10 B 2x10     SL hip abd 1# 2x10 B  ^next 1# 2x10 B 1# 2x10 B 2x10 B 1# 2x15 B 2x15 B 2x10 B 2x10 B Next      SL hip add 1# 2x10 B  ^next    -- -- 2x10 B x10 B W/ bridge     SL clams OOT next 10" X20 RTB 10"x15 RTB 10''x15 RTB 2x10 3'' holds  RTB 2x10 3'' holds  RTB -- 2x10 3'' holds  RTB 2x10 5'' holds  RTB                    Prone:              Alt UE/LE x15 B --    -- -- Next Next?     Hip ext  --  NT oot oot 2x10 B *note Next Next?     Hydrants Next --                        Seated:              TAs Next             March              LAQs                             Standing:              TAs       --       Lats Next      -- OOT 2x10 GTC     Steamboats Next 4-way RTB      --       Leg Press Next 5.5 S=5  2x10 DL 5.0 S=5  3x10 DL S=5, 5.0  2x10 DL oot oot   S=5, 5.0  2x10 DL OOT 2x10 DL  5 plates, bench = 5                      Initials CC JBF  1/6 DILIP  1/6 DILIP MNB CC DILIP CC     Assessment:     Improvement in pelvic alignment post MET, however still slightly rotated anteriorly on the R following 2 sets of MET. Pt with B valgus and R valgus " "collapse during gait; unsure if this is a structural issue or if a functional/hip weakness issue. Focus on core and hip strengthening to promote stability of pelvic alignment as well as R knee stability. If no changes in pain in the next several weeks, refer back to [facility] for MRI.    Patient Education/Response:     Pt given updated HEP including HS stretch, quad stretch, ITB stretch, gastroc stretches to be performed in addition to pelvic correction MET 2x/day; SLR with hip ER, SL hip abduction and adduction, bridges, TKE, and 4-way hip 1x/day. Pt verbalized understanding.     Plans and Goals:     Continue POC, focus on hip and core strength. Assess pelvic alignment each visit.    Short Term Goals (4 Weeks): 5/18/18  1. Pt will be compliant with HEP to supplement PT in decreasing pain with functional mobility.  2. Pt will perform and hold TA contraction for 10x10" in dynamic sitting activities to demonstrate improved core strength  3. Pt will improve impaired LE MMTs to >/=4/5 to improve strength for functional tasks.  4. Pt will improve B 9090 HS length >/=15 deg B to improve flexibility for normal movement patterns.   Long Term Goals (8 Weeks): 6/18/18  1. Pt will improve FOTO score to </= 25% limited to decrease perceived limitation with maintaining/changing body position  2. Pt will improve B 9090 HS length to full extension B to improve flexibility for normal movement patterns.   3. Pt will perform and hold TA contraction for 10x10" in dynamic standing activities to demonstrate improved core strength   4. Pt will improve impaired LE MMTs to >/=4+/5 to improve strength for functional tasks.  5. Pt will report no pain with standing > 1 hr to promote functional QOL.  6. Pt will report no pain with stair ascent promote functional mobility.  7. Pt will demonstrate proper pelvic alignment for >/= 1 week to decrease stresses placed on surrounding structures during functional movement.       "

## 2018-05-25 ENCOUNTER — TELEPHONE (OUTPATIENT)
Dept: REHABILITATION | Facility: HOSPITAL | Age: 33
End: 2018-05-25

## 2018-05-25 DIAGNOSIS — R53.1 DECREASED STRENGTH: ICD-10-CM

## 2018-05-25 DIAGNOSIS — M25.561 CHRONIC PAIN OF RIGHT KNEE: ICD-10-CM

## 2018-05-25 DIAGNOSIS — R26.89 IMPAIRED GAIT AND MOBILITY: ICD-10-CM

## 2018-05-25 DIAGNOSIS — R29.3 POSTURE ABNORMALITY: ICD-10-CM

## 2018-05-25 DIAGNOSIS — G89.29 CHRONIC PAIN OF RIGHT KNEE: ICD-10-CM

## 2018-05-25 NOTE — TELEPHONE ENCOUNTER
Called pt Wednesday, 5/23/18 regarding missed appointment the day before. Pt reports she wants to cancel remaining appointments and return to [facility] for MRI. PT discusses that improvements in LE strength to improve pain and stability in the R knee will take time to improve, inquired if pt was willing to come to session Thursday to discuss. Pt reported she would try. Called pt today regarding missing Thursday's appointment and inquired if pt would rather cancel remaining appointments, pt confirmed. Will d/c at [facility] expiration date.

## 2018-06-27 ENCOUNTER — DOCUMENTATION ONLY (OUTPATIENT)
Dept: REHABILITATION | Facility: HOSPITAL | Age: 33
End: 2018-06-27

## 2018-06-27 PROBLEM — M25.561 CHRONIC PAIN OF RIGHT KNEE: Status: RESOLVED | Noted: 2018-04-18 | Resolved: 2018-06-27

## 2018-06-27 PROBLEM — R53.1 DECREASED STRENGTH: Status: RESOLVED | Noted: 2018-04-18 | Resolved: 2018-06-27

## 2018-06-27 PROBLEM — R26.89 IMPAIRED GAIT AND MOBILITY: Status: RESOLVED | Noted: 2018-04-18 | Resolved: 2018-06-27

## 2018-06-27 PROBLEM — G89.29 CHRONIC PAIN OF RIGHT KNEE: Status: RESOLVED | Noted: 2018-04-18 | Resolved: 2018-06-27

## 2018-06-27 PROBLEM — R29.3 POSTURE ABNORMALITY: Status: RESOLVED | Noted: 2018-04-18 | Resolved: 2018-06-27

## 2018-06-27 NOTE — PROGRESS NOTES
Pt was evaluated on 18 and was seen 10 times for PT. Pt has not attended PT since 18, cancelling all remaining appts and planning to return to [facility] for MRI. Pt was given HEP including HS stretch, quad stretch, ITB stretch, gastroc stretches to be performed in addition to pelvic correction MET 2x/day; SLR with hip ER, SL hip abduction and adduction, bridges, TKE, and 4-way hip 1x/day. POC  18. Current status is unknown. Pt to be d/c'd at this time.